# Patient Record
(demographics unavailable — no encounter records)

---

## 2024-11-07 NOTE — HISTORY OF PRESENT ILLNESS
[FreeTextEntry1] : 48 yo lady with PMHx of pre diabetes and HLD and FMHx of CAD who presents as a new patient  11/07/24 ROS + 5y chest pain worse with exertion, relieved with rest. No dyspnea. No other symptoms.   PMHx/PSHx as above FMHx brother w/ CAD w/ stent Social hx no substance use

## 2024-11-07 NOTE — DISCUSSION/SUMMARY
[FreeTextEntry1] : 50 yo lady with PMHx of pre diabetes and HLD and FMHx of CAD who presents as a new patient  EKG 11/07/24 NSR  Assessment: 1. Cardiac chest pain c/w typical angina FMHx of CAD s/p stent (brother) Exercise stress test ~1y ago was negative 2. HLD 3. CKD - improved GFR   Plan: 1. Given hx of CKD, despite improved GFR, will avoid IV contrast. Will obtain nuclear exercise stress test at [facility] which per the patient is convenient as she works at [employer] 2. LDL <190 and w/o DM2 so no strict indication for statin - patient wishes to be off it for now 3. RTC 4-6 weeks after the NST   During non face-to-face time, I reviewed relevant portions of the patients medical record. During face-to-face time, I took a relevant history and examined the patient. I also explained differential diagnoses, relevant cardiac diagnoses, workup, and management plan, which required a moderate level of medical decision making. I answered all questions related to the patient's medical conditions.   Sung AG (John)  of Cardiology [facility] [employer] [facility] at [facility]        [EKG obtained to assist in diagnosis and management of assessed problem(s)] : EKG obtained to assist in diagnosis and management of assessed problem(s)

## 2024-11-07 NOTE — DISCUSSION/SUMMARY
[FreeTextEntry1] : 48 yo lady with PMHx of pre diabetes and HLD and FMHx of CAD who presents as a new patient  EKG 11/07/24 NSR  Assessment: 1. Cardiac chest pain c/w typical angina FMHx of CAD s/p stent (brother) Exercise stress test ~1y ago was negative 2. HLD 3. CKD - improved GFR   Plan: 1. Given hx of CKD, despite improved GFR, will avoid IV contrast. Will obtain nuclear exercise stress test at [facility] which per the patient is convenient as she works at [employer] 2. LDL <190 and w/o DM2 so no strict indication for statin - patient wishes to be off it for now 3. RTC 4-6 weeks after the NST   During non face-to-face time, I reviewed relevant portions of the patients medical record. During face-to-face time, I took a relevant history and examined the patient. I also explained differential diagnoses, relevant cardiac diagnoses, workup, and management plan, which required a moderate level of medical decision making. I answered all questions related to the patient's medical conditions.   Sung AG (John)  of Cardiology [facility] [employer] at [facility]        [EKG obtained to assist in diagnosis and management of assessed problem(s)] : EKG obtained to assist in diagnosis and management of assessed problem(s)

## 2024-12-12 NOTE — DISCUSSION/SUMMARY
[FreeTextEntry1] : 48 yo lady with PMHx of pre diabetes, CKD, and HLD and FMHx of CAD   EKG 11/07/24 NSR  Assessment: 1. Chest pain FMHx of CAD s/p stent (brother) Exercise stress test ~1y ago was negative Nuclear stress test Dec/2024 w/o any infarct or ischemia, EF w/ stress was 64% 2. HLD 3. CKD - improved GFR   Plan: 1. Obtain TTE as CAD ruled out as cause of chest pain 2. LDL <190 and w/o DM2 so no strict indication for statin - patient wishes to be off it for now 3. RTC 1mo w/ TTE   During non face-to-face time, I reviewed relevant portions of the patients medical record. During face-to-face time, I took a relevant history and examined the patient. I also explained differential diagnoses, relevant cardiac diagnoses, workup, and management plan, which required a moderate level of medical decision making. I answered all questions related to the patient's medical conditions.   Sung AG (John)  of Cardiology [facility] [employer] at [facility]

## 2024-12-12 NOTE — DISCUSSION/SUMMARY
[FreeTextEntry1] : 48 yo lady with PMHx of pre diabetes, CKD, and HLD and FMHx of CAD   EKG 11/07/24 NSR  Assessment: 1. Chest pain FMHx of CAD s/p stent (brother) Exercise stress test ~1y ago was negative Nuclear stress test Dec/2024 w/o any infarct or ischemia, EF w/ stress was 64% 2. HLD 3. CKD - improved GFR   Plan: 1. Obtain TTE as CAD ruled out as cause of chest pain 2. LDL <190 and w/o DM2 so no strict indication for statin - patient wishes to be off it for now 3. RTC 1mo w/ TTE   During non face-to-face time, I reviewed relevant portions of the patients medical record. During face-to-face time, I took a relevant history and examined the patient. I also explained differential diagnoses, relevant cardiac diagnoses, workup, and management plan, which required a moderate level of medical decision making. I answered all questions related to the patient's medical conditions.   Sung AG (John)  of Cardiology [facility] at [facility]

## 2024-12-12 NOTE — HISTORY OF PRESENT ILLNESS
[FreeTextEntry1] : 50 yo lady with PMHx of pre diabetes, CKD, and HLD and FMHx of CAD   12/11/24 ROS + same symptom as before with exertion 11/07/24 ROS + 5y chest pain worse with exertion, relieved with rest. No dyspnea. No other symptoms.

## 2024-12-12 NOTE — HISTORY OF PRESENT ILLNESS
[FreeTextEntry1] : 48 yo lady with PMHx of pre diabetes, CKD, and HLD and FMHx of CAD   12/11/24 ROS + same symptom as before with exertion 11/07/24 ROS + 5y chest pain worse with exertion, relieved with rest. No dyspnea. No other symptoms.